# Patient Record
(demographics unavailable — no encounter records)

---

## 2024-10-22 NOTE — CARDIOLOGY SUMMARY
[de-identified] : Normal sinus rhythm, IVCD, nonspecific ST-T wave changes, no significant change from prior [de-identified] : - CAD: CARDIAC CATHETERIZATION: 4/14/14, EF 55-60%, nonobstructive coronary artery disease, NUCLEAR STRESS TEST: 1/16/2024, normal, EF 63%  - VT: EVENT MONITOR: 4/2014, nonsustained VT. EP recommended beta blocker therapy, EVENT MONITOR: 3/19/2021, 3 day, NSR 69 (), rare APCs and PVCs, no sxms,  EVENT MONITOR: 7/10/2022, 7 day worn 30 hrs, NSR 70,(), HOLTER MONITOR: 2/5/2024, 7 day, NSR 68 (), rare APCs and PVCs, SVT 7, 7, and 21 beats(~100bpm), the patient reports that she had symptoms but did not register them on the monitor. - CAROTID US:  6/16/2023, normal - Diabetes - Hypercholesterolemia - Hypertension: intolerant amlodipine - Hypothyroidism - ECHOCARDIOGRAM: 6/16/2023, EF 60%, mild mitral regurgitation, trace tricuspid regurgitation RVSP 29 - Obesity: BMI 38 -Abnormal ECG/IVCD

## 2024-10-22 NOTE — HISTORY OF PRESENT ILLNESS
[FreeTextEntry1] : The patient is a 73-year-old white female with a past medical history remarkable for coronary artery disease, hypertension, hypercholesterolemia, carotid artery disease, and obesity. The patient does not exercise.  She is chest pain and dyspnea free.  Her cholesterol from 3/30/2024 was at target

## 2024-10-22 NOTE — DISCUSSION/SUMMARY
[FreeTextEntry1] : Palpitations Controlled with beta-blocker therapy    Edema In view of the patient's recent echocardiographic and laboratory results, her edema is probably secondary to her obesity /venous insufficiency/lymphedema Continue compression stockings  We rediscussed diet, exercise, and weight loss.  Coronary artery disease Asymptomatic. Continue medical therapy  Polyuria/Nocturia Urology evaluation recommended.  Noncompliant  RTO 6 months   [EKG obtained to assist in diagnosis and management of assessed problem(s)] : EKG obtained to assist in diagnosis and management of assessed problem(s)

## 2024-10-22 NOTE — PHYSICAL EXAM

## 2025-04-22 NOTE — HISTORY OF PRESENT ILLNESS
[FreeTextEntry1] : The patient is a 74-year-old white female with a past medical history remarkable for coronary artery disease, hypertension, hypercholesterolemia, carotid artery disease, and obesity. The patient does not exercise.  She is chest pain and dyspnea free.  Her cholesterol from 3/30/2024 was at target.  More recent laboratories are unavailable. The patient reports bilateral upper extremity paresthesias

## 2025-04-22 NOTE — DISCUSSION/SUMMARY
[EKG obtained to assist in diagnosis and management of assessed problem(s)] : EKG obtained to assist in diagnosis and management of assessed problem(s) [FreeTextEntry1] : Upper extremity numbness Normal peripheral pulses.  Questionably neuromuscular in etiology Ms. KATIUSKA GALVAN was instructed to follow-up in your office for evaluation of    Palpitations Controlled with beta-blocker therapy    Edema In view of the patient's recent echocardiographic and laboratory results, her edema is probably secondary to her obesity /venous insufficiency/lymphedema Continue compression stockings  We rediscussed diet, exercise, and weight loss.  Coronary artery disease Asymptomatic. Continue medical therapy  Polyuria/Nocturia Urology evaluation recommended.  Noncompliant  RTO 6 months

## 2025-04-22 NOTE — CARDIOLOGY SUMMARY
[de-identified] : Normal sinus rhythm, poor R wave progression, nonspecific conduction delay, no significant change from prior [de-identified] : - CAD: CARDIAC CATHETERIZATION: 4/14/2014, EF 55-60%, nonobstructive coronary artery disease, NUCLEAR STRESS TEST: 1/16/2024, normal, EF 63%  - VT: EVENT MONITOR: 4/2014, nonsustained VT. EP recommended beta blocker therapy, EVENT MONITOR: 3/19/2021, 3 day, NSR 69 (), rare APCs and PVCs, no sxms,  EVENT MONITOR: 7/10/2022, 7 day worn 30 hrs, NSR 70,(), HOLTER MONITOR: 2/5/2024, 7 day, NSR 68 (), rare APCs and PVCs, SVT 7, 7, and 21 beats(~100bpm), the patient reports that she had symptoms but did not register them on the monitor. - CAROTID US:  6/16/2023, normal - Diabetes - Hypercholesterolemia - Hypertension: intolerant amlodipine - Hypothyroidism - ECHOCARDIOGRAM: 6/16/2023, EF 60%, mild mitral regurgitation, trace tricuspid regurgitation RVSP 29 - Obesity: BMI 38 -  Abnormal ECG/IVCD

## 2025-04-22 NOTE — PHYSICAL EXAM
[Well Developed] : well developed [Well Nourished] : well nourished [No Acute Distress] : no acute distress [Normal Conjunctiva] : normal conjunctiva [Normal Venous Pressure] : normal venous pressure [No Carotid Bruit] : no carotid bruit [Normal S1, S2] : normal S1, S2 [No Rub] : no rub [No Gallop] : no gallop [Clear Lung Fields] : clear lung fields [Good Air Entry] : good air entry [No Respiratory Distress] : no respiratory distress  [Soft] : abdomen soft [Non Tender] : non-tender [No Masses/organomegaly] : no masses/organomegaly [Normal Bowel Sounds] : normal bowel sounds [Normal Gait] : normal gait [No Cyanosis] : no cyanosis [No Clubbing] : no clubbing [No Rash] : no rash [No Skin Lesions] : no skin lesions [Moves all extremities] : moves all extremities [No Focal Deficits] : no focal deficits [Normal Speech] : normal speech [Alert and Oriented] : alert and oriented [Normal memory] : normal memory [Murmur] : murmur [de-identified] : 1/6 systolic murmur [de-identified] : Nonpitting edema today